# Patient Record
Sex: FEMALE | Race: WHITE | URBAN - METROPOLITAN AREA
[De-identification: names, ages, dates, MRNs, and addresses within clinical notes are randomized per-mention and may not be internally consistent; named-entity substitution may affect disease eponyms.]

---

## 2022-11-02 ENCOUNTER — OFFICE (OUTPATIENT)
Dept: URBAN - METROPOLITAN AREA CLINIC 88 | Facility: CLINIC | Age: 77
Setting detail: OPHTHALMOLOGY
End: 2022-11-02
Payer: MEDICARE

## 2022-11-02 DIAGNOSIS — H35.373: ICD-10-CM

## 2022-11-02 DIAGNOSIS — H43.813: ICD-10-CM

## 2022-11-02 DIAGNOSIS — H44.21: ICD-10-CM

## 2022-11-02 DIAGNOSIS — H35.411: ICD-10-CM

## 2022-11-02 DIAGNOSIS — H43.393: ICD-10-CM

## 2022-11-02 DIAGNOSIS — H35.3121: ICD-10-CM

## 2022-11-02 PROCEDURE — 92134 CPTRZ OPH DX IMG PST SGM RTA: CPT | Performed by: OPHTHALMOLOGY

## 2022-11-02 PROCEDURE — 92012 INTRM OPH EXAM EST PATIENT: CPT | Performed by: OPHTHALMOLOGY

## 2022-11-02 ASSESSMENT — VISUAL ACUITY
OD_BCVA: 20/30
OS_BCVA: LP

## 2022-11-02 ASSESSMENT — REFRACTION_AUTOREFRACTION
OD_CYLINDER: UNABLE
OD_SPHERE: UNABLE
OS_CYLINDER: -2.00
OS_SPHERE: +3.50
OS_AXIS: 091
OD_AXIS: UNABLE

## 2022-11-02 ASSESSMENT — TONOMETRY
OS_IOP_MMHG: 18
OD_IOP_MMHG: 18

## 2022-11-02 ASSESSMENT — KERATOMETRY
OD_K2POWER_DIOPTERS: 44.00
OD_K1POWER_DIOPTERS: 43.25
OD_AXISANGLE_DEGREES: 070
OS_K2POWER_DIOPTERS: 44.00
METHOD_AUTO_MANUAL: AUTO
OS_AXISANGLE_DEGREES: 174
OS_K1POWER_DIOPTERS: 42.00

## 2022-11-02 ASSESSMENT — LID POSITION - ECTROPION
OS_ECTROPION: T
OD_ECTROPION: T

## 2022-11-02 ASSESSMENT — SPHEQUIV_DERIVED: OS_SPHEQUIV: 2.5

## 2022-11-02 ASSESSMENT — CONFRONTATIONAL VISUAL FIELD TEST (CVF)
OS_FINDINGS: FULL
OD_FINDINGS: FULL

## 2022-11-02 ASSESSMENT — AXIALLENGTH_DERIVED: OS_AL: 22.8272

## 2022-11-02 ASSESSMENT — LID POSITION - COMMENTS: OS_COMMENTS: LAGOPHTHALMOS 1MM

## 2022-11-04 ENCOUNTER — OFFICE (OUTPATIENT)
Dept: URBAN - METROPOLITAN AREA CLINIC 12 | Facility: CLINIC | Age: 77
Setting detail: OPHTHALMOLOGY
End: 2022-11-04
Payer: MEDICARE

## 2022-11-04 DIAGNOSIS — H25.11: ICD-10-CM

## 2022-11-04 DIAGNOSIS — H25.13: ICD-10-CM

## 2022-11-04 PROCEDURE — 92136 OPHTHALMIC BIOMETRY: CPT | Performed by: OPHTHALMOLOGY

## 2022-11-04 PROCEDURE — 99213 OFFICE O/P EST LOW 20 MIN: CPT | Performed by: OPHTHALMOLOGY

## 2022-11-04 ASSESSMENT — REFRACTION_AUTOREFRACTION
OS_CYLINDER: -1.75
OS_AXIS: 093
OD_AXIS: 046
OD_SPHERE: -24.75
OS_SPHERE: +3.00
OD_CYLINDER: -1.75

## 2022-11-04 ASSESSMENT — LID POSITION - ECTROPION
OD_ECTROPION: T
OS_ECTROPION: T

## 2022-11-04 ASSESSMENT — KERATOMETRY
OS_AXISANGLE_DEGREES: 178
METHOD_AUTO_MANUAL: AUTO
OS_K2POWER_DIOPTERS: 44.75
OD_K2POWER_DIOPTERS: 44.75
OS_K1POWER_DIOPTERS: 43.50
OD_AXISANGLE_DEGREES: 100
OD_K1POWER_DIOPTERS: 42.25

## 2022-11-04 ASSESSMENT — CONFRONTATIONAL VISUAL FIELD TEST (CVF)
OS_FINDINGS: FULL
OD_FINDINGS: FULL

## 2022-11-04 ASSESSMENT — AXIALLENGTH_DERIVED
OD_AL: 40.9
OS_AL: 22.5798

## 2022-11-04 ASSESSMENT — SPHEQUIV_DERIVED
OD_SPHEQUIV: -25.625
OS_SPHEQUIV: 2.125

## 2022-11-04 ASSESSMENT — VISUAL ACUITY
OD_BCVA: 20/60
OS_BCVA: LP

## 2022-11-04 ASSESSMENT — TONOMETRY
OD_IOP_MMHG: 15
OS_IOP_MMHG: 17

## 2022-11-04 ASSESSMENT — LID POSITION - COMMENTS: OS_COMMENTS: LAGOPHTHALMOS 1MM

## 2022-11-09 ENCOUNTER — OFFICE (OUTPATIENT)
Dept: URBAN - METROPOLITAN AREA CLINIC 12 | Facility: CLINIC | Age: 77
Setting detail: OPHTHALMOLOGY
End: 2022-11-09
Payer: MEDICARE

## 2022-11-09 DIAGNOSIS — Z20.822: ICD-10-CM

## 2022-11-09 DIAGNOSIS — Z01.812: ICD-10-CM

## 2022-11-09 PROCEDURE — 99211 OFF/OP EST MAY X REQ PHY/QHP: CPT | Performed by: OPHTHALMOLOGY

## 2022-11-09 ASSESSMENT — AXIALLENGTH_DERIVED
OD_AL: 40.9
OS_AL: 22.5798

## 2022-11-09 ASSESSMENT — KERATOMETRY
OD_AXISANGLE_DEGREES: 100
OS_K2POWER_DIOPTERS: 44.75
OS_AXISANGLE_DEGREES: 178
OD_K2POWER_DIOPTERS: 44.75
OS_K1POWER_DIOPTERS: 43.50
METHOD_AUTO_MANUAL: AUTO
OD_K1POWER_DIOPTERS: 42.25

## 2022-11-09 ASSESSMENT — REFRACTION_AUTOREFRACTION
OS_SPHERE: +3.00
OS_CYLINDER: -1.75
OS_AXIS: 093
OD_SPHERE: -24.75
OD_AXIS: 046
OD_CYLINDER: -1.75

## 2022-11-09 ASSESSMENT — SPHEQUIV_DERIVED
OS_SPHEQUIV: 2.125
OD_SPHEQUIV: -25.625

## 2022-11-09 ASSESSMENT — VISUAL ACUITY
OD_BCVA: 20/60
OS_BCVA: LP

## 2022-11-14 ENCOUNTER — ASC (OUTPATIENT)
Dept: URBAN - METROPOLITAN AREA SURGERY 8 | Facility: SURGERY | Age: 77
Setting detail: OPHTHALMOLOGY
End: 2022-11-14
Payer: MEDICARE

## 2022-11-14 DIAGNOSIS — H25.11: ICD-10-CM

## 2022-11-14 DIAGNOSIS — H52.211: ICD-10-CM

## 2022-11-14 PROCEDURE — 66984 XCAPSL CTRC RMVL W/O ECP: CPT | Performed by: OPHTHALMOLOGY

## 2022-11-14 PROCEDURE — A9270 NON-COVERED ITEM OR SERVICE: HCPCS | Performed by: OPHTHALMOLOGY

## 2022-11-14 PROCEDURE — FEMTO CATARACT LASER: Performed by: OPHTHALMOLOGY

## 2022-11-15 ENCOUNTER — RX ONLY (RX ONLY)
Age: 77
End: 2022-11-15

## 2022-11-15 ENCOUNTER — OFFICE (OUTPATIENT)
Dept: URBAN - METROPOLITAN AREA CLINIC 12 | Facility: CLINIC | Age: 77
Setting detail: OPHTHALMOLOGY
End: 2022-11-15
Payer: MEDICARE

## 2022-11-15 DIAGNOSIS — Z96.1: ICD-10-CM

## 2022-11-15 PROCEDURE — 99024 POSTOP FOLLOW-UP VISIT: CPT | Performed by: OPTOMETRIST

## 2022-11-15 ASSESSMENT — KERATOMETRY
OS_AXISANGLE_DEGREES: 115
OD_K1POWER_DIOPTERS: 41.25
OD_K2POWER_DIOPTERS: 43.50
OD_AXISANGLE_DEGREES: 042
OS_K1POWER_DIOPTERS: 43.00
METHOD_AUTO_MANUAL: AUTO
OS_K2POWER_DIOPTERS: 43.50

## 2022-11-15 ASSESSMENT — REFRACTION_AUTOREFRACTION
OD_SPHERE: -0.25
OS_CYLINDER: -1.00
OS_AXIS: 100
OS_SPHERE: +3.00
OD_CYLINDER: -1.25
OD_AXIS: 155

## 2022-11-15 ASSESSMENT — SPHEQUIV_DERIVED
OS_SPHEQUIV: 2.5
OD_SPHEQUIV: -0.875

## 2022-11-15 ASSESSMENT — AXIALLENGTH_DERIVED
OS_AL: 22.7415
OD_AL: 24.3709

## 2022-11-15 ASSESSMENT — CONFRONTATIONAL VISUAL FIELD TEST (CVF)
OD_FINDINGS: FULL
OS_FINDINGS: FULL

## 2022-11-15 ASSESSMENT — LID POSITION - ECTROPION
OS_ECTROPION: T
OD_ECTROPION: T

## 2022-11-15 ASSESSMENT — VISUAL ACUITY
OS_BCVA: HM
OD_BCVA: 20/60+2

## 2022-11-15 ASSESSMENT — LID POSITION - COMMENTS: OS_COMMENTS: LAGOPHTHALMOS 1MM

## 2022-11-15 ASSESSMENT — TONOMETRY
OD_IOP_MMHG: 21
OS_IOP_MMHG: 17

## 2022-11-22 ENCOUNTER — OFFICE (OUTPATIENT)
Dept: URBAN - METROPOLITAN AREA CLINIC 12 | Facility: CLINIC | Age: 77
Setting detail: OPHTHALMOLOGY
End: 2022-11-22
Payer: MEDICARE

## 2022-11-22 DIAGNOSIS — H25.12: ICD-10-CM

## 2022-11-22 PROCEDURE — 92136 OPHTHALMIC BIOMETRY: CPT | Performed by: OPHTHALMOLOGY

## 2022-11-22 ASSESSMENT — VISUAL ACUITY
OD_BCVA: 20/50-2
OS_BCVA: HM

## 2022-11-22 ASSESSMENT — KERATOMETRY
OS_AXISANGLE_DEGREES: 136
OS_K1POWER_DIOPTERS: 42.75
METHOD_AUTO_MANUAL: AUTO
OD_AXISANGLE_DEGREES: 078
OD_K2POWER_DIOPTERS: 43.50
OD_K1POWER_DIOPTERS: 40.75
OS_K2POWER_DIOPTERS: 44.00

## 2022-11-22 ASSESSMENT — CONFRONTATIONAL VISUAL FIELD TEST (CVF)
OD_FINDINGS: FULL
OS_FINDINGS: FULL

## 2022-11-22 ASSESSMENT — REFRACTION_AUTOREFRACTION
OD_SPHERE: -0.25
OD_CYLINDER: -2.75
OS_SPHERE: +3.00
OS_CYLINDER: -1.00
OD_AXIS: 019
OS_AXIS: 086

## 2022-11-22 ASSESSMENT — LID POSITION - ECTROPION
OD_ECTROPION: T
OS_ECTROPION: T

## 2022-11-22 ASSESSMENT — SPHEQUIV_DERIVED
OS_SPHEQUIV: 2.5
OD_SPHEQUIV: -1.625

## 2022-11-22 ASSESSMENT — TONOMETRY
OS_IOP_MMHG: 16
OD_IOP_MMHG: 20

## 2022-11-22 ASSESSMENT — LID POSITION - COMMENTS: OS_COMMENTS: LAGOPHTHALMOS 1MM

## 2022-11-22 ASSESSMENT — AXIALLENGTH_DERIVED
OD_AL: 24.7878
OS_AL: 22.6989

## 2022-11-23 ENCOUNTER — OFFICE (OUTPATIENT)
Dept: URBAN - METROPOLITAN AREA CLINIC 12 | Facility: CLINIC | Age: 77
Setting detail: OPHTHALMOLOGY
End: 2022-11-23
Payer: MEDICARE

## 2022-11-23 DIAGNOSIS — Z01.812: ICD-10-CM

## 2022-11-23 DIAGNOSIS — Z20.822: ICD-10-CM

## 2022-11-23 PROCEDURE — 99211 OFF/OP EST MAY X REQ PHY/QHP: CPT | Performed by: OPHTHALMOLOGY

## 2022-11-23 ASSESSMENT — REFRACTION_AUTOREFRACTION
OS_CYLINDER: -1.00
OS_SPHERE: +3.00
OD_AXIS: 019
OS_AXIS: 086
OD_SPHERE: -0.25
OD_CYLINDER: -2.75

## 2022-11-23 ASSESSMENT — KERATOMETRY
OS_K2POWER_DIOPTERS: 44.00
OD_AXISANGLE_DEGREES: 078
OS_K1POWER_DIOPTERS: 42.75
OD_K1POWER_DIOPTERS: 40.75
OD_K2POWER_DIOPTERS: 43.50
METHOD_AUTO_MANUAL: AUTO
OS_AXISANGLE_DEGREES: 136

## 2022-11-23 ASSESSMENT — AXIALLENGTH_DERIVED
OD_AL: 24.7878
OS_AL: 22.6989

## 2022-11-23 ASSESSMENT — VISUAL ACUITY
OS_BCVA: HM
OD_BCVA: 20/50-2

## 2022-11-23 ASSESSMENT — SPHEQUIV_DERIVED
OD_SPHEQUIV: -1.625
OS_SPHEQUIV: 2.5

## 2022-11-28 ENCOUNTER — ASC (OUTPATIENT)
Dept: URBAN - METROPOLITAN AREA SURGERY 8 | Facility: SURGERY | Age: 77
Setting detail: OPHTHALMOLOGY
End: 2022-11-28
Payer: MEDICARE

## 2022-11-28 DIAGNOSIS — H25.12: ICD-10-CM

## 2022-11-28 DIAGNOSIS — H52.212: ICD-10-CM

## 2022-11-28 PROCEDURE — FEMTO CATARACT LASER: Performed by: OPHTHALMOLOGY

## 2022-11-28 PROCEDURE — A9270 NON-COVERED ITEM OR SERVICE: HCPCS | Performed by: OPHTHALMOLOGY

## 2022-11-28 PROCEDURE — 66984 XCAPSL CTRC RMVL W/O ECP: CPT | Performed by: OPHTHALMOLOGY

## 2022-11-29 ENCOUNTER — OFFICE (OUTPATIENT)
Dept: URBAN - METROPOLITAN AREA CLINIC 12 | Facility: CLINIC | Age: 77
Setting detail: OPHTHALMOLOGY
End: 2022-11-29
Payer: MEDICARE

## 2022-11-29 ENCOUNTER — RX ONLY (RX ONLY)
Age: 77
End: 2022-11-29

## 2022-11-29 DIAGNOSIS — Z96.1: ICD-10-CM

## 2022-11-29 PROCEDURE — 99024 POSTOP FOLLOW-UP VISIT: CPT | Performed by: OPHTHALMOLOGY

## 2022-11-29 ASSESSMENT — LID POSITION - ECTROPION
OS_ECTROPION: T
OD_ECTROPION: T

## 2022-11-29 ASSESSMENT — KERATOMETRY
OD_AXISANGLE_DEGREES: 151
OS_K2POWER_DIOPTERS: 44.25
METHOD_AUTO_MANUAL: AUTO
OS_AXISANGLE_DEGREES: 048
OS_K1POWER_DIOPTERS: 40.00
OD_K1POWER_DIOPTERS: 42.25
OD_K2POWER_DIOPTERS: 43.75

## 2022-11-29 ASSESSMENT — AXIALLENGTH_DERIVED
OS_AL: 24.6807
OD_AL: 23.777

## 2022-11-29 ASSESSMENT — REFRACTION_AUTOREFRACTION
OD_CYLINDER: -1.00
OS_AXIS: 155
OS_CYLINDER: -0.75
OD_SPHERE: +0.50
OS_SPHERE: -1.00
OD_AXIS: 045

## 2022-11-29 ASSESSMENT — LID POSITION - COMMENTS: OS_COMMENTS: LAGOPHTHALMOS 1MM

## 2022-11-29 ASSESSMENT — CONFRONTATIONAL VISUAL FIELD TEST (CVF)
OS_FINDINGS: FULL
OD_FINDINGS: FULL

## 2022-11-29 ASSESSMENT — VISUAL ACUITY
OS_BCVA: HM
OD_BCVA: 20/80-1

## 2022-11-29 ASSESSMENT — CORNEAL EDEMA CLINICAL DESCRIPTION: OS_CORNEALEDEMA: 2+

## 2022-11-29 ASSESSMENT — SPHEQUIV_DERIVED
OS_SPHEQUIV: -1.375
OD_SPHEQUIV: 0

## 2022-11-29 ASSESSMENT — TONOMETRY
OD_IOP_MMHG: 21
OS_IOP_MMHG: 20

## 2022-12-06 ENCOUNTER — OFFICE (OUTPATIENT)
Dept: URBAN - METROPOLITAN AREA CLINIC 12 | Facility: CLINIC | Age: 77
Setting detail: OPHTHALMOLOGY
End: 2022-12-06
Payer: MEDICARE

## 2022-12-06 DIAGNOSIS — Z96.1: ICD-10-CM

## 2022-12-06 PROCEDURE — 99024 POSTOP FOLLOW-UP VISIT: CPT | Performed by: OPTOMETRIST

## 2022-12-06 ASSESSMENT — KERATOMETRY
METHOD_AUTO_MANUAL: AUTO
OD_AXISANGLE_DEGREES: 110
OD_K2POWER_DIOPTERS: 45.00
OD_K1POWER_DIOPTERS: 42.00
OS_AXISANGLE_DEGREES: 085
OS_K2POWER_DIOPTERS: 43.50
OS_K1POWER_DIOPTERS: 42.75

## 2022-12-06 ASSESSMENT — REFRACTION_AUTOREFRACTION
OD_CYLINDER: -2.00
OS_SPHERE: -0.50
OS_AXIS: 110
OD_AXIS: 021
OD_SPHERE: PLANO
OS_CYLINDER: -0.75

## 2022-12-06 ASSESSMENT — CONFRONTATIONAL VISUAL FIELD TEST (CVF)
OS_FINDINGS: FULL
OD_FINDINGS: FULL

## 2022-12-06 ASSESSMENT — AXIALLENGTH_DERIVED: OS_AL: 24.0805

## 2022-12-06 ASSESSMENT — CORNEAL EDEMA CLINICAL DESCRIPTION: OS_CORNEALEDEMA: T

## 2022-12-06 ASSESSMENT — VISUAL ACUITY
OS_BCVA: HM
OD_BCVA: 20/60-2

## 2022-12-06 ASSESSMENT — SPHEQUIV_DERIVED: OS_SPHEQUIV: -0.875

## 2022-12-06 ASSESSMENT — TONOMETRY
OS_IOP_MMHG: 19
OD_IOP_MMHG: 20

## 2022-12-06 ASSESSMENT — LID POSITION - ECTROPION
OD_ECTROPION: T
OS_ECTROPION: T

## 2022-12-06 ASSESSMENT — LID POSITION - COMMENTS: OS_COMMENTS: LAGOPHTHALMOS 1MM

## 2022-12-15 ENCOUNTER — OFFICE (OUTPATIENT)
Dept: URBAN - METROPOLITAN AREA CLINIC 12 | Facility: CLINIC | Age: 77
Setting detail: OPHTHALMOLOGY
End: 2022-12-15
Payer: MEDICARE

## 2022-12-15 DIAGNOSIS — Z96.1: ICD-10-CM

## 2022-12-15 PROCEDURE — 99024 POSTOP FOLLOW-UP VISIT: CPT | Performed by: OPHTHALMOLOGY

## 2022-12-15 ASSESSMENT — VISUAL ACUITY
OD_BCVA: 20/40-1
OS_BCVA: LP

## 2022-12-15 ASSESSMENT — KERATOMETRY
METHOD_AUTO_MANUAL: AUTO
OS_K2POWER_DIOPTERS: 43.75
OD_K1POWER_DIOPTERS: UNABLE
OS_AXISANGLE_DEGREES: 062
OS_K1POWER_DIOPTERS: 43.25

## 2022-12-15 ASSESSMENT — REFRACTION_AUTOREFRACTION
OD_SPHERE: -1.00
OD_CYLINDER: -1.25
OS_AXIS: 110
OD_AXIS: 055
OS_CYLINDER: -0.50
OS_SPHERE: -0.50

## 2022-12-15 ASSESSMENT — CORNEAL EDEMA CLINICAL DESCRIPTION
OS_CORNEALEDEMA: ABSENT
OD_CORNEALEDEMA: T

## 2022-12-15 ASSESSMENT — CONFRONTATIONAL VISUAL FIELD TEST (CVF)
OS_FINDINGS: FULL
OD_FINDINGS: FULL

## 2022-12-15 ASSESSMENT — LID POSITION - COMMENTS: OS_COMMENTS: LAGOPHTHALMOS 1MM

## 2022-12-15 ASSESSMENT — SPHEQUIV_DERIVED
OD_SPHEQUIV: -1.625
OS_SPHEQUIV: -0.75

## 2022-12-15 ASSESSMENT — AXIALLENGTH_DERIVED: OS_AL: 23.8878

## 2022-12-15 ASSESSMENT — LID POSITION - ECTROPION
OD_ECTROPION: T
OS_ECTROPION: T

## 2022-12-15 ASSESSMENT — TONOMETRY: OS_IOP_MMHG: 15

## 2022-12-17 ENCOUNTER — OFFICE (OUTPATIENT)
Dept: URBAN - METROPOLITAN AREA CLINIC 12 | Facility: CLINIC | Age: 77
Setting detail: OPHTHALMOLOGY
End: 2022-12-17
Payer: MEDICARE

## 2022-12-17 ENCOUNTER — RX ONLY (RX ONLY)
Age: 77
End: 2022-12-17

## 2022-12-17 DIAGNOSIS — H02.131: ICD-10-CM

## 2022-12-17 DIAGNOSIS — H43.393: ICD-10-CM

## 2022-12-17 DIAGNOSIS — Z96.1: ICD-10-CM

## 2022-12-17 DIAGNOSIS — H35.411: ICD-10-CM

## 2022-12-17 DIAGNOSIS — H43.813: ICD-10-CM

## 2022-12-17 DIAGNOSIS — H02.225: ICD-10-CM

## 2022-12-17 DIAGNOSIS — H44.21: ICD-10-CM

## 2022-12-17 DIAGNOSIS — H40.1110: ICD-10-CM

## 2022-12-17 DIAGNOSIS — H02.222: ICD-10-CM

## 2022-12-17 DIAGNOSIS — H02.134: ICD-10-CM

## 2022-12-17 DIAGNOSIS — H16.223: ICD-10-CM

## 2022-12-17 DIAGNOSIS — E05.00: ICD-10-CM

## 2022-12-17 PROCEDURE — 99024 POSTOP FOLLOW-UP VISIT: CPT | Performed by: OPHTHALMOLOGY

## 2022-12-17 ASSESSMENT — SPHEQUIV_DERIVED: OS_SPHEQUIV: -1.125

## 2022-12-17 ASSESSMENT — KERATOMETRY
METHOD_AUTO_MANUAL: AUTO
OS_K2POWER_DIOPTERS: 45.50
OS_AXISANGLE_DEGREES: 075
OS_K1POWER_DIOPTERS: 43.25
OD_AXISANGLE_DEGREES: 142
OD_K1POWER_DIOPTERS: 43.75
OD_K2POWER_DIOPTERS: 45.25

## 2022-12-17 ASSESSMENT — CORNEAL EDEMA CLINICAL DESCRIPTION
OS_CORNEALEDEMA: ABSENT
OD_CORNEALEDEMA: T

## 2022-12-17 ASSESSMENT — LID POSITION - ECTROPION
OS_ECTROPION: T
OD_ECTROPION: T

## 2022-12-17 ASSESSMENT — REFRACTION_AUTOREFRACTION
OS_AXIS: 130
OD_SPHERE: ERROR
OS_SPHERE: -0.75
OS_CYLINDER: -0.75

## 2022-12-17 ASSESSMENT — VISUAL ACUITY
OS_BCVA: LP
OD_BCVA: 20/30-

## 2022-12-17 ASSESSMENT — CONFRONTATIONAL VISUAL FIELD TEST (CVF)
OS_FINDINGS: FULL
OD_FINDINGS: FULL

## 2022-12-17 ASSESSMENT — TONOMETRY
OS_IOP_MMHG: 18
OD_IOP_MMHG: 17

## 2022-12-17 ASSESSMENT — LID POSITION - COMMENTS: OS_COMMENTS: LAGOPHTHALMOS 1MM

## 2022-12-17 ASSESSMENT — AXIALLENGTH_DERIVED: OS_AL: 23.7094

## 2023-02-23 ENCOUNTER — RX ONLY (RX ONLY)
Age: 78
End: 2023-02-23

## 2023-02-23 ENCOUNTER — OFFICE (OUTPATIENT)
Dept: URBAN - METROPOLITAN AREA CLINIC 100 | Facility: CLINIC | Age: 78
Setting detail: OPHTHALMOLOGY
End: 2023-02-23
Payer: MEDICARE

## 2023-02-23 DIAGNOSIS — H02.134: ICD-10-CM

## 2023-02-23 DIAGNOSIS — E05.00: ICD-10-CM

## 2023-02-23 DIAGNOSIS — H02.131: ICD-10-CM

## 2023-02-23 DIAGNOSIS — H16.223: ICD-10-CM

## 2023-02-23 DIAGNOSIS — H02.222: ICD-10-CM

## 2023-02-23 DIAGNOSIS — H02.225: ICD-10-CM

## 2023-02-23 PROCEDURE — 92285 EXTERNAL OCULAR PHOTOGRAPHY: CPT | Performed by: OPHTHALMOLOGY

## 2023-02-23 PROCEDURE — 99213 OFFICE O/P EST LOW 20 MIN: CPT | Performed by: OPHTHALMOLOGY

## 2023-02-23 ASSESSMENT — REFRACTION_AUTOREFRACTION
OS_AXIS: 130
OS_SPHERE: -0.75
OD_SPHERE: ERROR
OS_CYLINDER: -0.75

## 2023-02-23 ASSESSMENT — CONFRONTATIONAL VISUAL FIELD TEST (CVF)
OD_FINDINGS: FULL
OS_FINDINGS: FULL

## 2023-02-23 ASSESSMENT — KERATOMETRY
OS_K1POWER_DIOPTERS: 43.25
METHOD_AUTO_MANUAL: AUTO
OS_K2POWER_DIOPTERS: 45.50
OS_AXISANGLE_DEGREES: 075
OD_K2POWER_DIOPTERS: 45.25
OD_AXISANGLE_DEGREES: 142
OD_K1POWER_DIOPTERS: 43.75

## 2023-02-23 ASSESSMENT — LID POSITION - ECTROPION
OD_ECTROPION: T
OS_ECTROPION: T

## 2023-02-23 ASSESSMENT — LID POSITION - COMMENTS: OS_COMMENTS: LAGOPHTHALMOS 1MM

## 2023-02-23 ASSESSMENT — VISUAL ACUITY
OD_BCVA: 20/40-
OS_BCVA: LP

## 2023-02-23 ASSESSMENT — CORNEAL EDEMA CLINICAL DESCRIPTION
OD_CORNEALEDEMA: T
OS_CORNEALEDEMA: ABSENT

## 2023-02-23 ASSESSMENT — SPHEQUIV_DERIVED: OS_SPHEQUIV: -1.125

## 2023-02-23 ASSESSMENT — AXIALLENGTH_DERIVED: OS_AL: 23.7094

## 2023-09-26 ENCOUNTER — APPOINTMENT (OUTPATIENT)
Dept: INFECTIOUS DISEASE | Facility: CLINIC | Age: 78
End: 2023-09-26
Payer: MEDICARE

## 2023-09-26 ENCOUNTER — NON-APPOINTMENT (OUTPATIENT)
Age: 78
End: 2023-09-26

## 2023-09-26 DIAGNOSIS — Z23 ENCOUNTER FOR IMMUNIZATION: ICD-10-CM

## 2023-09-26 PROBLEM — Z00.00 ENCOUNTER FOR PREVENTIVE HEALTH EXAMINATION: Status: ACTIVE | Noted: 2023-09-26

## 2023-09-26 PROCEDURE — G0008: CPT

## 2023-09-26 PROCEDURE — 90662 IIV NO PRSV INCREASED AG IM: CPT

## 2024-05-12 ENCOUNTER — NON-APPOINTMENT (OUTPATIENT)
Age: 79
End: 2024-05-12

## 2024-05-13 ENCOUNTER — APPOINTMENT (OUTPATIENT)
Dept: GASTROENTEROLOGY | Facility: CLINIC | Age: 79
End: 2024-05-13
Payer: MEDICARE

## 2024-05-13 VITALS
WEIGHT: 137 LBS | HEIGHT: 62 IN | BODY MASS INDEX: 25.21 KG/M2 | HEART RATE: 73 BPM | SYSTOLIC BLOOD PRESSURE: 100 MMHG | DIASTOLIC BLOOD PRESSURE: 60 MMHG | OXYGEN SATURATION: 98 %

## 2024-05-13 DIAGNOSIS — F41.8 OTHER SPECIFIED ANXIETY DISORDERS: ICD-10-CM

## 2024-05-13 DIAGNOSIS — K59.09 OTHER CONSTIPATION: ICD-10-CM

## 2024-05-13 DIAGNOSIS — K64.9 UNSPECIFIED HEMORRHOIDS: ICD-10-CM

## 2024-05-13 DIAGNOSIS — K21.9 GASTRO-ESOPHAGEAL REFLUX DISEASE W/OUT ESOPHAGITIS: ICD-10-CM

## 2024-05-13 DIAGNOSIS — Z86.39 PERSONAL HISTORY OF OTHER ENDOCRINE, NUTRITIONAL AND METABOLIC DISEASE: ICD-10-CM

## 2024-05-13 DIAGNOSIS — Z78.9 OTHER SPECIFIED HEALTH STATUS: ICD-10-CM

## 2024-05-13 PROCEDURE — 99205 OFFICE O/P NEW HI 60 MIN: CPT

## 2024-05-13 RX ORDER — RISPERIDONE 1 MG/1
1 TABLET ORAL
Refills: 0 | Status: ACTIVE | COMMUNITY

## 2024-05-13 RX ORDER — UBIDECARENONE/VIT E ACET 100MG-5
1000 CAPSULE ORAL
Refills: 0 | Status: ACTIVE | COMMUNITY

## 2024-05-13 RX ORDER — DULOXETINE HYDROCHLORIDE 20 MG/1
20 CAPSULE, DELAYED RELEASE ORAL
Refills: 0 | Status: ACTIVE | COMMUNITY

## 2024-05-13 RX ORDER — HYDROCORTISONE 10 MG/G
1 CREAM TOPICAL
Qty: 1 | Refills: 0 | Status: ACTIVE | COMMUNITY
Start: 2024-05-13 | End: 1900-01-01

## 2024-05-13 RX ORDER — LEVOTHYROXINE SODIUM 50 UG/1
50 TABLET ORAL
Refills: 0 | Status: ACTIVE | COMMUNITY

## 2024-05-13 RX ORDER — ROSUVASTATIN CALCIUM 10 MG/1
10 TABLET, FILM COATED ORAL
Refills: 0 | Status: ACTIVE | COMMUNITY

## 2024-05-13 RX ORDER — ARIPIPRAZOLE 10 MG/1
10 TABLET ORAL
Refills: 0 | Status: ACTIVE | COMMUNITY

## 2024-05-13 RX ORDER — OMEPRAZOLE 20 MG/1
20 CAPSULE, DELAYED RELEASE ORAL
Refills: 0 | Status: ACTIVE | COMMUNITY

## 2024-05-13 RX ORDER — ZINC OXIDE AND COCOA BUTTER 270; 2052 MG/1; MG/1
76-10 SUPPOSITORY RECTAL
Qty: 30 | Refills: 1 | Status: ACTIVE | COMMUNITY
Start: 2024-05-13 | End: 1900-01-01

## 2024-05-13 RX ORDER — LAMOTRIGINE 100 MG/1
100 TABLET ORAL
Refills: 0 | Status: ACTIVE | COMMUNITY

## 2024-05-13 RX ORDER — BIOTIN 10000 MCG
10 CAPSULE ORAL
Refills: 0 | Status: ACTIVE | COMMUNITY

## 2024-05-13 NOTE — HISTORY OF PRESENT ILLNESS
[FreeTextEntry1] : 78-year-old female with history of thyroid cancer status post thyroidectomy maintained on Synthroid/HLD on Crestor/anxiety/depression on Cymbalta/Lamictal/risperidone/Abilify/GERD on omeprazole presents for second opinion consultation for abdominal bloating, excessive flatus, constipation.  Patient normally sees Dr. Whaley at [place] She has been on Metamucil for many many years, developed abdominal bloating and significant flatulence for the past few months, so she was switched to MiraLAX once daily.  Since being switched to MiraLAX, she would have [place] #5 bowel movements but for over 1 hour and a half, causing her to have worsening hemorrhoidal irritation.  She no longer wants to see Dr. Whaley as it took his office 3 days to return a phone call.  She did stop her MiraLAX as they were exacerbating her hemorrhoids, and switch to 3 tablets of Colace in the morning on her own.  On this regimen, her bowel movements are [place] #1 and she is going every 2 to 3 days with significant pain.  She denied any excessive straining prior to being changed onto Colace.  She did switch to Crestor for HLD last month but her GI symptoms preceded that.  She was told that she had a thickened endometrial stripe, had transvaginal ultrasound, last seen 6 months ago and was recommended to continue to monitor.  She denies any nausea/vomiting/weight loss.  Her last colonoscopy was performed 2 years ago, she was told that she had hemorrhoids and a polyp and her recommended surveillance interval at that time was 3 years.  No family history of GI malignancies

## 2024-05-13 NOTE — CONSULT LETTER
[Dear  ___] : Dear  [unfilled], [Consult Letter:] : I had the pleasure of evaluating your patient, [unfilled]. [Please see my note below.] : Please see my note below. [Consult Closing:] : Thank you very much for allowing me to participate in the care of this patient.  If you have any questions, please do not hesitate to contact me. [Sincerely,] : Sincerely, [FreeTextEntry2] : Dr. Marybeth Merrill [FreeTextEntry3] : Junie Black MD System Director of GI Motility, [facility]  of Medicine Division of Gastroenterology [facility] at [address], [address] [place] [address] Tel: [phone] Fax: [phone]

## 2024-05-13 NOTE — ASSESSMENT
[FreeTextEntry1] : 78-year-old female with anxiety/depression/thyroid cancer status post thyroidectomy/HLD presents for change in his bowel habit, abdominal bloating, flatulence after discontinuing Metamucil.  I counseled her on restarting Metamucil, and following up with a half dose of MiraLAX at night.  She typically takes 3 scoops of Metamucil, we will decrease it to 2 scoops.  I have asked her to discontinue her Colace.  In terms of her hemorrhoids, given that they were causing painful bowel movements, and bright red blood with wiping, I have recommended that she follow-up with a colorectal surgeon.  She is asking to have her care transferred to the [facility], closer to her house.  She will be due for colon cancer screening next year.  I have referred her to local gastroenterologist in [facility] that can help further manage her constipation and hemorrhoids.

## 2024-06-26 PROBLEM — H52.13 MYOPIA ; BOTH EYES: Status: ACTIVE | Noted: 2024-06-26

## 2024-06-26 PROBLEM — H52.4 PRESBYOPIA ; BOTH EYES: Status: ACTIVE | Noted: 2024-06-26

## 2025-02-27 ENCOUNTER — APPOINTMENT (OUTPATIENT)
Dept: ORTHOPEDIC SURGERY | Facility: CLINIC | Age: 80
End: 2025-02-27

## 2025-07-03 ENCOUNTER — APPOINTMENT (OUTPATIENT)
Dept: GASTROENTEROLOGY | Facility: CLINIC | Age: 80
End: 2025-07-03
Payer: MEDICARE

## 2025-07-03 ENCOUNTER — NON-APPOINTMENT (OUTPATIENT)
Age: 80
End: 2025-07-03

## 2025-07-03 VITALS
BODY MASS INDEX: 27.23 KG/M2 | WEIGHT: 148 LBS | DIASTOLIC BLOOD PRESSURE: 80 MMHG | OXYGEN SATURATION: 95 % | SYSTOLIC BLOOD PRESSURE: 123 MMHG | HEIGHT: 62 IN | HEART RATE: 76 BPM

## 2025-07-03 PROBLEM — K62.5 RECTAL BLEEDING: Status: ACTIVE | Noted: 2025-07-03

## 2025-07-03 PROBLEM — R13.10 DYSPHAGIA: Status: ACTIVE | Noted: 2025-07-03

## 2025-07-03 PROCEDURE — 99215 OFFICE O/P EST HI 40 MIN: CPT

## 2025-07-03 RX ORDER — LINACLOTIDE 72 UG/1
72 CAPSULE, GELATIN COATED ORAL
Qty: 30 | Refills: 3 | Status: ACTIVE | COMMUNITY
Start: 2025-07-03 | End: 1900-01-01

## 2025-07-07 RX ORDER — SODIUM SULFATE, MAGNESIUM SULFATE, AND POTASSIUM CHLORIDE 17.75; 2.7; 2.25 G/1; G/1; G/1
1479-225-188 TABLET ORAL
Qty: 1 | Refills: 0 | Status: ACTIVE | COMMUNITY
Start: 2025-07-03 | End: 1900-01-01
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
## 2025-07-15 NOTE — ASU PATIENT PROFILE, ADULT - FALL HARM RISK - UNIVERSAL INTERVENTIONS
Bed in lowest position, wheels locked, appropriate side rails in place/Call bell, personal items and telephone in reach/Instruct patient to call for assistance before getting out of bed or chair/Non-slip footwear when patient is out of bed/[place] to call system/Physically safe environment - no spills, clutter or unnecessary equipment/Purposeful Proactive Rounding/Room/bathroom lighting operational, light cord in reach

## 2025-07-16 ENCOUNTER — RESULT REVIEW (OUTPATIENT)
Age: 80
End: 2025-07-16

## 2025-07-16 ENCOUNTER — OUTPATIENT (OUTPATIENT)
Dept: OUTPATIENT SERVICES | Facility: HOSPITAL | Age: 80
LOS: 1 days | End: 2025-07-16
Payer: MEDICARE

## 2025-07-16 ENCOUNTER — APPOINTMENT (OUTPATIENT)
Dept: GASTROENTEROLOGY | Facility: HOSPITAL | Age: 80
End: 2025-07-16

## 2025-07-16 ENCOUNTER — TRANSCRIPTION ENCOUNTER (OUTPATIENT)
Age: 80
End: 2025-07-16

## 2025-07-16 VITALS
TEMPERATURE: 97 F | DIASTOLIC BLOOD PRESSURE: 70 MMHG | HEART RATE: 75 BPM | HEIGHT: 61 IN | OXYGEN SATURATION: 100 % | RESPIRATION RATE: 15 BRPM | WEIGHT: 149.91 LBS | SYSTOLIC BLOOD PRESSURE: 136 MMHG

## 2025-07-16 VITALS
RESPIRATION RATE: 13 BRPM | OXYGEN SATURATION: 98 % | DIASTOLIC BLOOD PRESSURE: 69 MMHG | HEART RATE: 70 BPM | SYSTOLIC BLOOD PRESSURE: 114 MMHG

## 2025-07-16 DIAGNOSIS — E89.0 POSTPROCEDURAL HYPOTHYROIDISM: Chronic | ICD-10-CM

## 2025-07-16 DIAGNOSIS — K59.09 OTHER CONSTIPATION: ICD-10-CM

## 2025-07-16 PROCEDURE — 45385 COLONOSCOPY W/LESION REMOVAL: CPT

## 2025-07-16 PROCEDURE — 43239 EGD BIOPSY SINGLE/MULTIPLE: CPT

## 2025-07-16 PROCEDURE — 88305 TISSUE EXAM BY PATHOLOGIST: CPT | Mod: 26

## 2025-07-16 RX ORDER — SODIUM CHLORIDE 9 G/1000ML
500 INJECTION, SOLUTION INTRAVENOUS
Refills: 0 | Status: DISCONTINUED | OUTPATIENT
Start: 2025-07-16 | End: 2025-07-30

## 2025-07-16 NOTE — PRE PROCEDURE NOTE - PROCEDURE SERVICE
H&P/Endoscopy Griseofulvin Counseling:  I discussed with the patient the risks of griseofulvin including but not limited to photosensitivity, cytopenia, liver damage, nausea/vomiting and severe allergy.  The patient understands that this medication is best absorbed when taken with a fatty meal (e.g., ice cream or french fries).

## 2025-07-16 NOTE — ASU DISCHARGE PLAN (ADULT/PEDIATRIC) - NS MD DC FALL RISK RISK
For information on Fall & Injury Prevention, visit: [email or web address] OR  [email or web address] OR  https://www.cdc.gov/steadi/patient.html

## 2025-07-16 NOTE — PRE PROCEDURE NOTE - PRE PROCEDURE EVALUATION
Pre-Endoscopy Evaluation    Referring Physician:                                    Indication for Procedure:  surveillance colon/GERD    Sedation by Anesthesia [X ]    PAST MEDICAL & SURGICAL HISTORY:  Anxiety      H/O thyroidectomy          Latex allergy: [ ] yes [X] no    Smoking: [ ] yes  [X] no    AICD/PPM: [ ] yes   [X] no    Pertinent lab data:                      Physical Examination:  Daily     Daily   Vital Signs Last 24 Hrs  T(C): --  T(F): --  HR: --  BP: --  BP(mean): --  RR: --  SpO2: --        Constitutional: NAD    HEENT: PERRLA, EOMI,       Neck:  No JVD    Respiratory: CTAB/L    Cardiovascular: S1 and S2    Gastrointestinal: BS+, soft, NT/ND    Extremities: No peripheral edema    Neurological: A/O x 3, no focal deficits    Psychiatric: Normal mood, normal affect    : No Momin    Skin: No rashes    Comments:    ASA Class: I [ ]  II [X ]  III [ ]  IV [ ]    The patient is a suitable candidate for the planned procedure unless box checked [ ]  No, explain:

## 2025-07-16 NOTE — ASU PREOP CHECKLIST - NSWEIGHTCALCTOOLDRUG_GEN_A_CORE
Pt c/o blood in stool since yesterday, describes as blood in the toilet but the \"stool is actually normal color.\" Denies n/v, denies abd pain, +diarrhea.    used

## 2025-07-16 NOTE — ASU DISCHARGE PLAN (ADULT/PEDIATRIC) - FINANCIAL ASSISTANCE
[facility] provides services at a reduced cost to those who are determined to be eligible through [facility]’s financial assistance program. Information regarding [facility]’s financial assistance program can be found by going to [email or web address] or by calling [phone].

## 2025-07-18 LAB — SURGICAL PATHOLOGY STUDY: SIGNIFICANT CHANGE UP

## 2025-07-24 RX ORDER — LINACLOTIDE 145 UG/1
145 CAPSULE, GELATIN COATED ORAL
Qty: 30 | Refills: 2 | Status: ACTIVE | COMMUNITY
Start: 2025-07-24 | End: 1900-01-01

## (undated) DEVICE — BITE BLOCK ADULT 20 X 27MM (GREEN)

## (undated) DEVICE — SNARE LESIONHUNTER ROTAT NITNL COLD 10MM

## (undated) DEVICE — TUBING SUCTION NONCONDUCTIVE 6MM X 12FT

## (undated) DEVICE — CONTAINER FORMALIN 80ML YELLOW

## (undated) DEVICE — POLY TRAP ETRAP

## (undated) DEVICE — SALIVA EJECTOR (BLUE)

## (undated) DEVICE — ELCTR GROUNDING PAD ADULT COVIDIEN

## (undated) DEVICE — DRSG BANDAID 0.75X3"

## (undated) DEVICE — CLAMP BX HOT RAD JAW 3

## (undated) DEVICE — PACK IV START WITH CHG

## (undated) DEVICE — ELCTR ECG CONDUCTIVE ADHESIVE

## (undated) DEVICE — BASIN EMESIS 10IN GRADUATED MAUVE

## (undated) DEVICE — BIOPSY FORCEP COLD DISP

## (undated) DEVICE — DRSG 2X2

## (undated) DEVICE — CATH IV SAFE BC 22G X 1" (BLUE)

## (undated) DEVICE — UNDERPAD LINEN SAVER 17 X 24"

## (undated) DEVICE — DRSG CURITY GAUZE SPONGE 4 X 4" 12-PLY NON-STERILE

## (undated) DEVICE — GOWN LG

## (undated) DEVICE — BIOPSY FORCEP RADIAL JAW 4 STANDARD WITH NEEDLE

## (undated) DEVICE — LUBRICATING JELLY HR ONE SHOT 3G

## (undated) DEVICE — TUBING IV SET GRAVITY 3Y 100" MACRO

## (undated) DEVICE — TUBING MEDI-VAC W MAXIGRIP CONNECTORS 1/4"X6'

## (undated) DEVICE — DENTURE CUP PINK